# Patient Record
Sex: FEMALE | Race: WHITE
[De-identification: names, ages, dates, MRNs, and addresses within clinical notes are randomized per-mention and may not be internally consistent; named-entity substitution may affect disease eponyms.]

---

## 2018-08-11 ENCOUNTER — HOSPITAL ENCOUNTER (EMERGENCY)
Dept: HOSPITAL 45 - C.EDB | Age: 33
Discharge: HOME | End: 2018-08-11
Payer: COMMERCIAL

## 2018-08-11 VITALS — DIASTOLIC BLOOD PRESSURE: 71 MMHG | SYSTOLIC BLOOD PRESSURE: 98 MMHG | HEART RATE: 70 BPM | OXYGEN SATURATION: 98 %

## 2018-08-11 VITALS
HEIGHT: 65.98 IN | WEIGHT: 116.62 LBS | WEIGHT: 116.62 LBS | BODY MASS INDEX: 18.74 KG/M2 | HEIGHT: 65.98 IN | BODY MASS INDEX: 18.74 KG/M2

## 2018-08-11 VITALS — TEMPERATURE: 98.6 F
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
## 2018-08-11 DIAGNOSIS — F17.200: ICD-10-CM

## 2018-08-11 DIAGNOSIS — F41.9: ICD-10-CM

## 2018-08-11 DIAGNOSIS — Z79.899: ICD-10-CM

## 2018-08-11 DIAGNOSIS — S09.90XA: Primary | ICD-10-CM

## 2018-08-11 DIAGNOSIS — Z88.0: ICD-10-CM

## 2018-08-11 DIAGNOSIS — Z88.8: ICD-10-CM

## 2018-08-11 DIAGNOSIS — Z71.6: ICD-10-CM

## 2018-08-11 NOTE — EMERGENCY ROOM VISIT NOTE
History


Report prepared by Dima:  Trent Farias


Under the Supervision of:  Dr. Logan Reid M.D.


First contact with patient:  15:55


Chief Complaint:  HEAD INJURY (MINOR)


Stated Complaint:  HEAD/NECK INJURY, TREE BRANCH FELL ON SIDE OF HEAD





History of Present Illness


The patient is a 33 year old  female with a past medical history of 

migraines, fibromyalgia, and anxiety who presents to the ED with a cc of a 

severe, constant headache after a large branch fell from a tree and struck the 

right side of her head about an hour ago. The patient describes the pain as 

burning, sharp, and pulsating. The patient notes that she did not lose 

consciousness nor did she fall. The patient also reports taking aspirin for 

headaches as well as Norflex and Mirtazapine. Positive for regular extremity 

movement and smoking. The patient also reports rare alcohol use.





   Source of History:  patient


   Onset:  One hour ago


   Position:  head


   Symptom Intensity:  severe


   Quality:  burning, sharp, other (Pulsating)


   Associated Symptoms:  No LOC





Review of Systems


See HPI for pertinent positives and negatives.  A total of ten systems were 

reviewed and were otherwise negative.





Past Medical & Surgical


Medical Problems:


(1) Anxiety


(2) Fibromyalgia


(3) Migraines








Family History





Patient reports no known family medical history.





Social History


Smoking Status:  Current Every Day Smoker


Marital Status:  single





Current/Historical Medications


Scheduled


Amphetamine-Dextroamphetamine 30MG (Adderall 30MG), 30 MG PO BID


Clonazepam (Klonopin), 1 MG PO TID


Gabapentin (Neurontin), 600 MG PO DAILY


Meloxicam (Mobic), 15 MG PO DAILY


Trazodone Hcl (Trazodone), 200 PO HS





Allergies


Coded Allergies:  


     Amoxicillin (Verified  Allergy, Unknown, UNKNOWN, 8/11/18)


     Metoclopramide (Verified  Allergy, Unknown, UNKNOWN, 8/11/18)





Physical Exam


Vital Signs











  Date Time  Temp Pulse Resp B/P (MAP) Pulse Ox O2 Delivery O2 Flow Rate FiO2


 


8/11/18 17:17  70 18 98/71 98   


 


8/11/18 16:02   18     


 


8/11/18 15:52 37.0 95 20 137/89 99 Room Air  











Physical Exam


GENERAL: Awake, alert, well-appearing, NAD


HENT: Normocephalic, atraumatic. Reproducible parietal pain TTP. No evidence of 

depressed skull fracture or bleeding.


EYES: Normal conjunctiva. Sclera non-icteric. PERRL. No anisocoria. 6mm dilated 

bilaterally  


NECK: Supple. No nuchal rigidity. FROM. No midline Cervical spine TTP, Right 

sided Cervical spine paraspinal TTP


RESPIRATORY: CTAB, no rhonchi, wheezing, crackles


CARDIAC: RRR, no MRG


ABDOMEN: Soft, NTND, BS+


MSK: No chest wall TTP, no LE edema


NEURO: GCS 15, CN 2-12 intact, moves all 4s on command


SKIN: No rash or jaundice noted.





Medical Decision & Procedures


ER Provider


Diagnostic Interpretation:


Radiology results as stated below per my review and radiologist interpretation: 








CT SCAN OF THE BRAIN WITHOUT IV CONTRAST





CLINICAL HISTORY: Headache.





COMPARISON STUDY:  No priors.





TECHNIQUE: Unenhanced axial CT scan of the brain is performed from the vertex to


the skull base.  A dose lowering technique was utilized adhering to the


principles of ALARA.





CT DOSE: 537.48 mGy.cm





FINDINGS:





Brain parenchyma: The brain parenchyma is normal in appearance. There is no


hemorrhage, mass effect, or evidence of acute territorial ischemia by CT


criteria. Gray-white matter is preserved. No extra-axial fluid collection is


seen.





Ventricles, sulci, cisterns: Normal in configuration.





Intracranial vasculature: The visualized intracranial vasculature at the skull


base is normal in appearance.





Calvarium: Unremarkable.





Sinuses and mastoids: The visualized paranasal sinuses are clear. The mastoid


air cells are well pneumatized.





Orbits: The bony orbits are grossly intact.








IMPRESSION: No acute intracranial abnormality.











Electronically signed by:  Anand Cisneros M.D.


8/11/2018 4:48 PM





Dictated Date/Time:  8/11/2018 4:47 PM





Medications Administered











 Medications


  (Trade)  Dose


 Ordered  Sig/Olman


 Route  Start Time


 Stop Time Status Last Admin


Dose Admin


 


 Prochlorperazine


 Edisylate


  (Compazine Inj)  10 mg  NOW  STAT


 IV  8/11/18 16:05


 8/11/18 16:06 DC 8/11/18 16:26


10 MG


 


 Ketorolac


 Tromethamine


  (Toradol Inj)  30 mg  NOW  STAT


 IV  8/11/18 16:05


 8/11/18 16:06 DC 8/11/18 16:25


30 MG


 


 Acetaminophen


  (Tylenol Tab)  1,000 mg  NOW  STAT


 PO  8/11/18 16:05


 8/11/18 16:06 DC 8/11/18 16:26


1,000 MG


 


 Diphenhydramine


 HCl


  (Benadryl Inj)  25 mg  NOW  STAT


 IV  8/11/18 16:05


 8/11/18 16:06 DC 8/11/18 16:25


25 MG


 


 Sodium Chloride  500 ml @ 


 999 mls/hr  Q31M STAT


 IV  8/11/18 16:05


 8/11/18 16:35 DC 8/11/18 16:26


999 MLS/HR











ED Course


1600: The patient was evaluated in room B3B. A complete history and physical 

exam was performed.





1706: I reevaluated the patient and she is feeling better 





1714: I reevaluated the patient. Discussed results and discharge instructions: 

She verbalized understanding and agreement. The patient is ready for discharge.





Medical Decision


Nursing notes reviewed. Ancillary studies and prior records reviewed. 





The patient is a 33 year old  female with a past medical history of 

migraines, fibromyalgia, and anxiety who presents to the ED with a cc of a 

severe, constant headache after a large branch fell from a tree and struck the 

right side of her head about an hour ago. 





Differential diagnosis:


Etiologies such as migraine headache, meningitis, sinusitis, CO exposure, ICH, 

SAH, infection, tumor, headache, sinus thrombosis, arterial dissection, as well 

as others were entertained.





Patient was seen and evaluated the bedside.  Patient had been walking on [address] 

when a large branch struck in the right side of the head.  Patient denies any 

LOC and does not have focal neuro complaints.  Patient denies any numbness, 

tingling, or weakness.  The patient does state though that she does have some 

chronic decreased sensation to left lower extremity secondary to some 

hemiplegic migraines.  The patient is otherwise asymptomatic other than having 

some headache pain in the right parietal area.  No evidence of skull fracture.  

There are no depressions there.  The patient occasionally does take aspirin but 

has not taken in several days.





Patient did receive a headache cocktail did have a CT of the brain as they did 

show me the picture the branch which appeared quite large.  The patient has no C

-spine tenderness to palpation.  Patient is Nexus negative.  No CT C-spine at 

this time.





CT brain negative acute.  I did reassess the patient patient is feeling 

improved.  The patient was told she may continue to have some mild headache.  

Patient was on smoking cessation at the bedside.  Patient was told to follow-up 

as an outpatient.  Patient was given strict follow-up, discharge, and return 

precautions.  All questions were answered.  Patient was deemed suitable for 

outpatient follow-up at this time.  Patient agreed with the plan of care and 

was safely discharged home.





Medication Reconcilliation


Current Medication List:  was personally reviewed by me





Blood Pressure Screening


Patient's blood pressure:  Normal blood pressure





Impression





 Primary Impression:  


 Closed head injury


 Additional Impression:  


 Encounter for smoking cessation counseling





Scribe Attestation


The scribe's documentation has been prepared under my direction and personally 

reviewed by me in its entirety. I confirm that the note above accurately 

reflects all work, treatment, procedures, and medical decision making performed 

by me.





Departure Information


Dispostion


Home / Self-Care





Referrals


No Doctor, Assigned (PCP)





Forms


HOME CARE DOCUMENTATION FORM,                                                 

               IMPORTANT VISIT INFORMATION





Patient Instructions


ED Head Injury Closed, My [facility]





Additional Instructions





Please return to the emergency department if you have worsening or recurrent 

symptoms not amenable to at-home treatment.  Please call for a follow-up 

appointment with her primary care physician.  Please take your medications as 

prescribed.  If you have other concerns and/or complaints please feel free to 

also call your primary care physician's office or return the ED for further 

evaluation, management, and treatment.





You received narcotic or benzodiazepene medication while in the emergency room 

today. This is an addictive medication that may cause drowziness as well as 

constipation. Do not drive, operate heavy machinery, or drink alcohol under the 

influence of this medication.





You may take 600 mg Ibuprofen every 6 hours as needed for pain/fever with food 

unless told by your physician not to take NSAIDs. You may take tylenol 650 mg 

every 6 hours as needed for pain/fever unless told by your physician to not 

take it or have liver problems. You may take motrin and tylenol separately or 

at the same time. 





Take your medications as prescribed. 





You have been examined and treated today on an emergency basis only. This is 

not a substitute for, or an effort to provide, complete comprehensive medical 

care. It is impossible to recognize and treat all injuries or illnesses in a 

single emergency department visit. It is therefore important that you follow up 

closely with [facility], your PCP, and/or your specialist(s). 

Call as soon as possible for an appointment.





Thank you for your time and consideration. I look forward to speaking with you 

again soon. Please don't hesitate to call us if you have any questions.





Problem Qualifiers








 Primary Impression:  


 Closed head injury


 Encounter type:  initial encounter  Qualified Codes:  S09.90XA - Unspecified 

injury of head, initial encounter

## 2018-08-11 NOTE — DIAGNOSTIC IMAGING REPORT
CT SCAN OF THE BRAIN WITHOUT IV CONTRAST



CLINICAL HISTORY: Headache.



COMPARISON STUDY:  No priors.



TECHNIQUE: Unenhanced axial CT scan of the brain is performed from the vertex to

the skull base.  A dose lowering technique was utilized adhering to the

principles of ALARA.



CT DOSE: 537.48 mGy.cm



FINDINGS:



Brain parenchyma: The brain parenchyma is normal in appearance. There is no

hemorrhage, mass effect, or evidence of acute territorial ischemia by CT

criteria. Gray-white matter is preserved. No extra-axial fluid collection is

seen.



Ventricles, sulci, cisterns: Normal in configuration.



Intracranial vasculature: The visualized intracranial vasculature at the skull

base is normal in appearance.



Calvarium: Unremarkable.



Sinuses and mastoids: The visualized paranasal sinuses are clear. The mastoid

air cells are well pneumatized.



Orbits: The bony orbits are grossly intact.





IMPRESSION: No acute intracranial abnormality.







Electronically signed by:  Anand Cisneros M.D.

8/11/2018 4:48 PM



Dictated Date/Time:  8/11/2018 4:47 PM